# Patient Record
Sex: FEMALE | URBAN - METROPOLITAN AREA
[De-identification: names, ages, dates, MRNs, and addresses within clinical notes are randomized per-mention and may not be internally consistent; named-entity substitution may affect disease eponyms.]

---

## 2021-05-26 ENCOUNTER — APPOINTMENT (RX ONLY)
Dept: URBAN - METROPOLITAN AREA CLINIC 174 | Facility: CLINIC | Age: 46
Setting detail: DERMATOLOGY
End: 2021-05-26

## 2021-05-26 DIAGNOSIS — L30.1 DYSHIDROSIS [POMPHOLYX]: ICD-10-CM | Status: INADEQUATELY CONTROLLED

## 2021-05-26 PROCEDURE — ? TREATMENT REGIMEN

## 2021-05-26 PROCEDURE — ? ORDER TESTS

## 2021-05-26 PROCEDURE — ? PRESCRIPTION

## 2021-05-26 PROCEDURE — ? COUNSELING

## 2021-05-26 PROCEDURE — 99203 OFFICE O/P NEW LOW 30 MIN: CPT

## 2021-05-26 RX ORDER — TRIAMCINOLONE ACETONIDE 1 MG/G
1 APPLICATION OINTMENT TOPICAL BID
Qty: 1 | Refills: 1 | Status: ERX | COMMUNITY
Start: 2021-05-26

## 2021-05-26 RX ADMIN — TRIAMCINOLONE ACETONIDE 1 APPLICATION: 1 OINTMENT TOPICAL at 00:00

## 2021-05-26 ASSESSMENT — LOCATION DETAILED DESCRIPTION DERM
LOCATION DETAILED: LEFT DORSAL FOOT
LOCATION DETAILED: RIGHT LATERAL DORSAL FOOT
LOCATION DETAILED: LEFT RADIAL DORSAL HAND
LOCATION DETAILED: RIGHT RADIAL DORSAL HAND

## 2021-05-26 ASSESSMENT — LOCATION ZONE DERM
LOCATION ZONE: FEET
LOCATION ZONE: HAND

## 2021-05-26 ASSESSMENT — LOCATION SIMPLE DESCRIPTION DERM
LOCATION SIMPLE: RIGHT HAND
LOCATION SIMPLE: LEFT HAND
LOCATION SIMPLE: RIGHT FOOT
LOCATION SIMPLE: LEFT FOOT

## 2021-05-26 ASSESSMENT — SEVERITY ASSESSMENT 2020: SEVERITY 2020: MODERATE

## 2021-05-26 NOTE — PROCEDURE: ORDER TESTS
Bill For Surgical Tray: no
Billing Type: Third-Party Bill
Expected Date Of Service: 05/26/2021
Performing Laboratory: 0

## 2021-05-26 NOTE — PROCEDURE: TREATMENT REGIMEN
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
Samples Given: Eucerin advanced repair, use after every hand wash.
Plan: A culture was taken of one of the pustules today.  F/u 1 month.
Initiate Treatment: TAC Oint BID until 50% improved then QD until next visit
Detail Level: Zone

## 2021-06-02 ENCOUNTER — RX ONLY (OUTPATIENT)
Age: 46
Setting detail: RX ONLY
End: 2021-06-02

## 2021-06-02 RX ORDER — SULFAMETHOXAZOLE AND TRIMETHOPRIM 800; 160 MG/1; MG/1
1 TAB TABLET ORAL BID
Qty: 28 | Refills: 0 | Status: ERX | COMMUNITY
Start: 2021-06-02

## 2021-06-28 ENCOUNTER — APPOINTMENT (RX ONLY)
Dept: URBAN - METROPOLITAN AREA CLINIC 174 | Facility: CLINIC | Age: 46
Setting detail: DERMATOLOGY
End: 2021-06-28

## 2021-06-28 DIAGNOSIS — L30.1 DYSHIDROSIS [POMPHOLYX]: ICD-10-CM | Status: INADEQUATELY CONTROLLED

## 2021-06-28 PROCEDURE — 99213 OFFICE O/P EST LOW 20 MIN: CPT

## 2021-06-28 PROCEDURE — ? ORDER TESTS

## 2021-06-28 PROCEDURE — ? TREATMENT REGIMEN

## 2021-06-28 PROCEDURE — ? COUNSELING

## 2021-06-28 ASSESSMENT — LOCATION SIMPLE DESCRIPTION DERM
LOCATION SIMPLE: LEFT FOOT
LOCATION SIMPLE: RIGHT HAND
LOCATION SIMPLE: LEFT HAND
LOCATION SIMPLE: RIGHT FOOT

## 2021-06-28 ASSESSMENT — LOCATION DETAILED DESCRIPTION DERM
LOCATION DETAILED: LEFT THENAR EMINENCE
LOCATION DETAILED: LEFT RADIAL DORSAL HAND
LOCATION DETAILED: RIGHT RADIAL DORSAL HAND
LOCATION DETAILED: RIGHT LATERAL DORSAL FOOT
LOCATION DETAILED: LEFT DORSAL FOOT

## 2021-06-28 ASSESSMENT — LOCATION ZONE DERM
LOCATION ZONE: HAND
LOCATION ZONE: FEET

## 2021-06-28 NOTE — PROCEDURE: TREATMENT REGIMEN
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
Continue Regimen: TAC Oint BID PRN
Plan: She is having another pustular breakout so another culture was taken today.  I will call her when it is available and send in an antibiotic PRN.  She has been isntructed on the above.  F/u 1 month.
Detail Level: Zone

## 2021-06-28 NOTE — PROCEDURE: ORDER TESTS
Bill For Surgical Tray: no
Billing Type: Third-Party Bill
Expected Date Of Service: 06/28/2021
Performing Laboratory: 0

## 2021-08-03 ENCOUNTER — APPOINTMENT (RX ONLY)
Dept: URBAN - METROPOLITAN AREA CLINIC 174 | Facility: CLINIC | Age: 46
Setting detail: DERMATOLOGY
End: 2021-08-03

## 2021-08-03 DIAGNOSIS — L30.1 DYSHIDROSIS [POMPHOLYX]: ICD-10-CM | Status: STABLE

## 2021-08-03 PROCEDURE — ? TREATMENT REGIMEN

## 2021-08-03 PROCEDURE — ? COUNSELING

## 2021-08-03 PROCEDURE — 99213 OFFICE O/P EST LOW 20 MIN: CPT

## 2021-08-03 ASSESSMENT — LOCATION DETAILED DESCRIPTION DERM
LOCATION DETAILED: LEFT RADIAL DORSAL HAND
LOCATION DETAILED: RIGHT RADIAL DORSAL HAND

## 2021-08-03 ASSESSMENT — LOCATION SIMPLE DESCRIPTION DERM
LOCATION SIMPLE: RIGHT HAND
LOCATION SIMPLE: LEFT HAND

## 2021-08-03 ASSESSMENT — LOCATION ZONE DERM: LOCATION ZONE: HAND

## 2021-08-03 NOTE — PROCEDURE: TREATMENT REGIMEN
Continue Regimen: TAC Oint BID PRN flares
Samples Given: Amlactin: apply BID
Plan: Overall she is improving.  Her last culture was negative.  We discussed the importance of continued moisturization.  Since she is dry and scaling I gave her some samples of Amlactin to try.  F/u 2 months or sooner PRN.
Detail Level: Zone